# Patient Record
Sex: MALE | Race: WHITE | NOT HISPANIC OR LATINO | URBAN - NONMETROPOLITAN AREA
[De-identification: names, ages, dates, MRNs, and addresses within clinical notes are randomized per-mention and may not be internally consistent; named-entity substitution may affect disease eponyms.]

---

## 2019-02-18 ENCOUNTER — OFFICE VISIT CONVERTED (OUTPATIENT)
Dept: PULMONOLOGY | Facility: CLINIC | Age: 69
End: 2019-02-18
Attending: INTERNAL MEDICINE

## 2019-02-18 ENCOUNTER — HOSPITAL ENCOUNTER (OUTPATIENT)
Dept: OTHER | Facility: HOSPITAL | Age: 69
Discharge: HOME | End: 2019-02-18
Attending: INTERNAL MEDICINE

## 2019-02-22 ENCOUNTER — HOSPITAL ENCOUNTER (OUTPATIENT)
Dept: PET IMAGING | Facility: HOSPITAL | Age: 69
Discharge: HOME | End: 2019-02-22
Attending: INTERNAL MEDICINE

## 2019-02-25 ENCOUNTER — HOSPITAL ENCOUNTER (OUTPATIENT)
Dept: GASTROENTEROLOGY | Facility: HOSPITAL | Age: 69
Setting detail: HOSPITAL OUTPATIENT SURGERY
Discharge: HOME | End: 2019-02-25
Attending: INTERNAL MEDICINE

## 2019-02-25 LAB
EPI CELLS NFR FLD: 3 %
LYMPHOCYTES NFR FLD MANUAL: 88 %
MACROPHAGE FLUID: 2 /100{WBCS}
NEUTROPHILS NFR FLD MANUAL: 7 %
VISUAL PRESENCE OF BLOOD: NORMAL

## 2019-02-27 LAB
BACTERIA SPEC AEROBE CULT: NORMAL
CONV BRONCHIAL WASH CULTURE: NORMAL

## 2021-05-28 VITALS
OXYGEN SATURATION: 96 % | WEIGHT: 192.25 LBS | SYSTOLIC BLOOD PRESSURE: 144 MMHG | TEMPERATURE: 98.4 F | RESPIRATION RATE: 12 BRPM | HEIGHT: 69 IN | BODY MASS INDEX: 28.47 KG/M2 | HEART RATE: 69 BPM | DIASTOLIC BLOOD PRESSURE: 81 MMHG

## 2021-05-28 NOTE — PROGRESS NOTES
Patient: LANIE BAZZI     Acct: [id number]     Report: #[id number]  UNIT #: [id number]     : 1950    Encounter Date:2019  PRIMARY CARE: SHAHRIAR HOLT  ***Signed***  --------------------------------------------------------------------------------------------------------------------  Chief Complaint      Encounter Date      2019            Primary Care Provider      JORDYN MARIE            Referring Provider      JORDYN MARIE            Patient Complaint      Patient is complaining of      hx of pleural effusions            VITALS      Height 5 ft 9.00 in / 175.26 cm      Weight 192 lbs 4.000 oz / 87.680627 kg      BSA 2.03 m2      BMI 28.4 kg/m2      Temperature 98.4 F / 36.89 C - Oral      Pulse 69      Respirations 12      Blood Pressure 144/81 Sitting, Right Arm      Pulse Oximetry 96%, roomair      Initial Exhaled Nitrous Oxide      Date:  2019      Exhaled Nitrous Oxide Results:  5            HPI      The patient is a very pleasant 68 year old  male cigarettes smoker here    for evaluation of pleural effusions.             The patient states that he had a right side pleural effusion with a liter     drained about 2 months ago and then a month later had 750 ml drained from his     right chest. He had this done at [facility]. It was sent for analysis but     he never got results and I have none of these records sent to me as of yet. He     had no further chest imaging. He also is a cigarettes smoker and smoked 2 packs     a day for a number of years and has cut down to currently 5-10 cigarettes a day.    He denies any shortness of breath but has a lot of issues with intermittent     bronchitis. He has a chronic cough that is productive of yellow sputum daily.      He occasionally has episodes of hemoptysis. He is on Symbicort 160/4.5 which     helps his cough somewhat. He uses it two puffs twice daily and does not have a     rescue inhaler. He denies any wheezing,  chest pain or hemoptysis. He denies any     orthopnea, leg swelling or paroxysmal nocturnal dyspnea.  He denies any cardiac     history and only takes a blood pressure pill and Symbicort. He denies any weight    loss, chest pain, nausea and vomiting, fevers or chills, or night sweats. He     denies any bone pain or headaches. He has no history of cancer. He has been told    he has emphysema before. He is able to perform his activities of daily living     without difficulty and denies any swollen lymph nodes or glands in his head and     neck.             I have personally reviewed the Review of Systems, past family, social, surgical     and medical histories and I agree with the findings.            ROS      Constitutional:  Denies: Fatigue, Fever, Weight gain, Weight loss, Chills,     Insomnia, Other      Respiratory/Breathing:  Complains of: Shortness of air, Wheezing, Cough; Denies:    Hemoptysis, Pleuritic pain, Other      Endocrine:  Denies: Polydipsia, Polyuria, Heat/cold intolerance, Diabetes, Other      Eyes:  Denies: Blurred vision, Vision Changes, Other      Ears, nose, mouth, throat:  Denies: Mouth lesions, Thrush, Throat pain,     Hoarseness, Allergies/Hay Fever, Post Nasal Drip, Headaches, Recent Head Injury,    Nose Bleeding, Neck Stiffness, Thyroid Mass, Hearing Loss, Ear Fullness, Dry     Mouth, Nasal or Sinus Pain, Dry Lips, Nasal discharge, Nasal congestion, Other      Cardiovascular:  Denies: Palpitations, Syncope, Claudication, Chest Pain, Wake     up Gasping for air, Leg Swelling, Irregular Heart Rate, Cyanosis, Dyspnea on     Exertion, Other      Gastrointestinal:  Denies: Nausea, Constipation, Diarrhea, Abdominal pain,     Vomiting, Difficulty Swallowing, Reflux/Heartburn, Dysphagia, Jaundice,     Bloating, Melena, Bloody stools, Other      Genitourinary:  Denies: Urinary frequency, Incontinence, Hematuria, Urgency,     Nocturia, Dysuria, Testicular problems, Other      Musculoskeletal:   Denies: Joint Pain, Joint Stiffness, Joint Swelling, Myalgias,    Other      Hematologic/lymphatic:  DENIES: Lymphadenopathy, Bruising, Bleeding tendencies,     Other      Neurological:  Denies: Headache, Numbness, Weakness, Seizures, Other      Psychiatric:  Denies: Anxiety, Appropriate Effect, Depression, Other      Sleep:  No: Excessive daytime sleep, Morning Headache?, Snoring, Insomnia?, Stop    breathing at sleep?, Other      Integumentary:  Denies: Rash, Dry skin, Skin Warm to Touch, Other      Immunologic/Allergic:  Denies: Latex allergy, Seasonal allergies, Asthma,     Urticaria, Eczema, Other      Immunization status:  No: Up to date            FAMILY/SOCIAL/MEDICAL HX      Surgical History:  Yes: Ear Surgery, Hernia Surgery (2009), Nose Surgery,     Tonsils      Stroke - Family Hx:  Sister      Heart - Family Hx:  Mother (bypass)      Diabetes - Family Hx:  Mother      Cancer/Type - Family Hx:  Brother (x2), Uncle      Is Father Still Living?:  No      Is Mother Still Living?:  No       Family History:  Yes      Social History:  Tobacco Use; No Alcohol Use, No Recreational Drug use      Smoking status:  Current every day smoker (smokes 5-6 ciger per day currently/     1.5 ppd inthe past x 60 y)      Anticoagulation Therapy:  No      Antibiotic Prophylaxis:  No      Medical History:  Yes: Chemotherapy/Cancer (skin), Chronic Bronchitis/COPD      Psychiatric History      none            PREVENTION      Hx Influenza Vaccination:  Yes      Date Influenza Vaccine Given:  Nov 1, 2018      Influenza Vaccine Declined:  No      2 or More Falls Past Year?:  No      Fall Past Year with Injury?:  No      Hx Pneumococcal Vaccination:  Yes      Encouraged to follow-up with:  PCP regarding preventative exams.      Chart initiated by      danielle/ ma            ALLERGIES/MEDICATIONS      Allergies:        Coded Allergies:             Wellbutrin (Verified  Allergy, 2/18/19)      Medications    Last Reconciled on  2/18/19 08:49 by GARCÍA ZIMMERMAN MD      Budesonide/Formoterol Fumarate (Symbicort 160/4.5 Mcg) Unknown Strength Inh      INH RTBID, #1 INH 0 Refills         Reported         2/18/19       Amlodipine/Benazepril Hcl (Lotrel 5/10 MG*) 1 Each Capsule      1 CAP PO QDAY, CAP         Reported         2/18/19      Current Medications      Current Medications Reviewed 2/18/19            EXAM      Vital Signs Reviewed      Gen: WDWN, Alert, NAD.        HEENT:  PERRL, EOMI.  OP, nares clear, no sinus tenderness.      Neck:  Supple, no JVD, no thyromegaly.      Lymph: No axillary, cervical, supraclavicular lymphadenopathy noted bilaterally.      Chest:  Good aeration, diminished right base otherwise clear to auscultation,     dull to percussion right base, no work of breathing noted.      CV:  RRR, no MGR, pulses 2+, equal.      Abd:  Soft, NT, ND, + BS, no HSM.      EXT:  No clubbing of upper extremity digits noted, no cyanosis, no edema, no     joint tenderness.       Neuro:  A  Skin: No rashes or lesions.      Vtials      Vitals:             Height 5 ft 9.00 in / 175.26 cm           Weight 192 lbs 4.000 oz / 87.997633 kg           BSA 2.03 m2           BMI 28.4 kg/m2           Temperature 98.4 F / 36.89 C - Oral           Pulse 69           Respirations 12           Blood Pressure 144/81 Sitting, Right Arm           Pulse Oximetry 96%, roomair            REVIEW      Results Reviewed      [facility] Results Reviewed?:  Yes [facility] (I personally reviewed the     patient's office notes from his referring provider. )            Assessment      Pleural effusion - J90            Cough - R05            Tobacco abuse - Z72.0            Emphysema of lung         Centrilobular emphysema - J43.2         Emphysema type: centrilobular            Notes      New Medications      * AMLODIPINE/BENAZEPRIL HCL (Lotrel 5/10 MG*) 1 EACH CAPSULE: 1 CAP PO QDAY      * Budesonide/Formoterol Fumarate (Symbicort 160/4.5 Mcg) Unknown  Strength INH:       INH RTBID #1      New Diagnostics      * Chest W/O Cont CT, As Soon As Possible         Dx: Pleural effusion - J90      * 6 Min Walk With Pulse Ox, Routine         Dx: Pleural effusion - J90      * PFT-Comp, PrePost,DLCO,BodyBox, Week         Dx: Pleural effusion - J90      * Alpha 1 Antitrypsin , Month         Dx: Pleural effusion - J90      IMPRESSION:      1. Chronic cough.       2. Emphysema.       3. Pleural effusion predominantly right sided unclear etiology at this time.     Clinically has pleural effusion on exam and will need evaluation from [facility] regarding his last thoracentesis to help assist with etiology.       4. Tobacco abuse of cigarettes with heavy smoking history and has cut back.       5. Intermittent hemoptysis.             PLAN:      1. I performed exhaled nitrous oxide testing in the office today.  His level is     5 indicative of no eosinophilic airway inflammation.       2. Etiology of pleural effusion is unclear at this time. I have requested rec    ords from [facility] including thoracentesis note, all imaging,     pleural fluid analysis and cytology.       3. I will check a chest without contrast as soon as possible given his     hemoptysis, pleural effusion and smoking history to evaluate for lung cancer. If    pleural effusion persists I personally would like to do diagnostic and     therapeutic thoracentesis to assist in determining the etiology. Risks and     benefits were discussed with the patient and he is willing to undergo the     procedure if pleural effusion is identified.       4. Check pulmonary function tests to assess for airflow obstruction and     bronchodilator response.        5. Check six minute walk test.       6. Check alpha-1 antitrypsin level and genotype.       7. The patient is up to date with flu, Prevnar and Pneumovax.       8. Smoking cessation counseling provided. I spent 4 minutes today counseling the    patient  on risks of smoking, including throat cancer, lung cancer, COPD, heart     disease and death. I also discussed the benefits of quitting. He is cutting back    on his own. I gave him 1-800 QUIT NOW number. He refuses nicotine replacement     therapy or pharmacotherapy.       9. Continue Symbicort 160/4.5 two puffs twice daily.       9. Follow up in 1 month to discuss imaging and test results. If pleural effusion    is large on imaging we will do thoracentesis prior to this.            Patient Education      Tobacco Cessation Counseling:  for 3 - 10 minutes      Patient Education Provided:  COPD, Smoking Cessation            Patient Education:        Pleural Effusion            Procedure Orders      Get Consent signed for:        Diagnostic and therapeutic ultrasound-guided thoracentesis.  Bronchoscopy      with endobronchial ultrasound/fine needle at aeration, brushings,      biopsies, bronchoalveolar lavage      Risks and Benefits:        I have discussed the risks of the procedure with the patient including      pneumothorax, hemothorax, bleeding, hypoxia, required mechanical      ventilation and death. The patient recognizes these findings, acknowledges      these findings and is agreeable to the procedure.  I have discussed the      risks of the procedure with the patient including pneumothorax,      hemothorax, bleeding, hypoxia and death. The patient recognizes these      findings, acknowledges these findings and is agreeable to the procedure.                 Disclaimer: Converted document may not contain table formatting or lab diagrams. Please see [employer] for the authenticated document.